# Patient Record
Sex: MALE
[De-identification: names, ages, dates, MRNs, and addresses within clinical notes are randomized per-mention and may not be internally consistent; named-entity substitution may affect disease eponyms.]

---

## 2022-09-01 ENCOUNTER — APPOINTMENT (OUTPATIENT)
Dept: ORTHOPEDIC SURGERY | Facility: CLINIC | Age: 22
End: 2022-09-01

## 2022-09-01 VITALS — WEIGHT: 165 LBS | HEIGHT: 73 IN | BODY MASS INDEX: 21.87 KG/M2

## 2022-09-01 DIAGNOSIS — Z78.9 OTHER SPECIFIED HEALTH STATUS: ICD-10-CM

## 2022-09-01 PROBLEM — Z00.00 ENCOUNTER FOR PREVENTIVE HEALTH EXAMINATION: Status: ACTIVE | Noted: 2022-09-01

## 2022-09-01 PROCEDURE — 99204 OFFICE O/P NEW MOD 45 MIN: CPT | Mod: 25

## 2022-09-01 PROCEDURE — L1812: CPT

## 2022-09-03 NOTE — DATA REVIEWED
[MRI] : MRI [Right] : of the right [Knee] : knee [Report was reviewed and noted in the chart] : The report was reviewed and noted in the chart [I independently reviewed and interpreted images and report] : I independently reviewed and interpreted images and report [I reviewed the films/CD and additionally noted] : I reviewed the films/CD and additionally noted [FreeTextEntry1] : MPFL tear, bone contusion lateral femoral condyle and medial patella, ACL and MCL sprains

## 2022-09-03 NOTE — HISTORY OF PRESENT ILLNESS
[de-identified] : The patient is a 22 year old R hand dominant male who presents today complaining of R knee pain.  \par Date of Injury/Onset: 08/26/2022\par Pain:    At Rest: 3/10 \par With Activity:  5/10 \par Mechanism of injury: Acute onset of pain and instability at the end of a follow through of a baseball swing.  \par This is not a Work Related Injury being treated under Worker's Compensation.\par This is not an athletic injury occurring associated with an interscholastic or organized sports team.\par Quality of symptoms: Soreness, swelling, pressure\par Improves with: N/A\par Worse with: Walking up/down stairs \par Prior treatment: Sleeve, ice, elevation\par Prior Imaging: MRI ([facility])\par Out of work/sport: _, since _\par School/Sport/Position/Occupation: Student, [employer]\par Additional Information: Needed crutches for first 48 hours but has not needed them since. \par

## 2022-09-03 NOTE — IMAGING
[de-identified] : The patient is a well appearing 22 year old M of their stated age.\par Patient ambulates with a normal gait.\par Negative straight leg raise bilateral\par \par Effected Knee: RIGHT                        	\par ROM:  0-145 degrees\par Lachman: Negative\par Pivot Shift: Negative\par Anterior Drawer: Negative\par Posterior Drawer / Sag:Negative\par Varus Stress 0 degrees: Stable\par Varus Stress 30 degrees: Stable\par Valgus Stress 0 degrees: Stable\par Valgus Stress 30 degrees: Stable\par Medial Nadya: Negative\par Lateral Nadya: Negative\par Patella Glide: 2 to 3+\par Patella Apprehension: Negative\par Patella Grind: Negative\par \par Palpation:\par Medial Joint Line: Nontender\par Lateral Joint Line: Nontender\par Medial Collateral Ligament: Nontender\par Lateral Collateral Ligament/PLC: Nontender\par Medial Femoral Condyle: Nontender\par Medial Retinaculum: Nontender\par Medial Patellofemoral Ligament: TENDER\par Distal Femur: Nontender\par Proximal Tibia: Nontender\par Tibial Tubercle: Nontender\par Distal Pole Patella: Nontender\par Quadriceps Tendon: Nontender &  Intact\par Patella Tendon: Nontender &  Intact\par Medial Distal Hamstring/PES: Nontender\par Lateral Distal Hamstring: Nontender & Stable\par Iliotibial Band: Nontender\par Medial Patellofemoral Ligament: Nontender\par Adductor: Nontender\par Proximal GSC-Plantaris: Nontender\par Calf: Supple & Nontender\par \par Inspection:\par Deformity: No\par Erythema: No\par Ecchymosis: No\par Abrasions: No\par Effusion: No\par Prepatella Bursitis: No\par Neurologic Exam:\par Sensation L4-S1: Grossly Intact\par \par Motor Exam:\par Quadriceps: 5 out of 5\par Hamstrings: 5 out of 5\par EHL: 5 out of 5\par FHL: 5 out of 5\par TA: 5 out of 5\par GS: 5 out of 5\par Circulatory/Pulses:\par Dorsalis Pedis: 2+\par Posterior Tibialis: 2+\par Additional Pertinent Findings: None\par Contralateral Knee:                           	\par ROM: 0-145 degrees\par Other Pertinent Findings: None\par \par Assessment: The patient is a 22 year old M with right knee pain and radiographic and physical exam findings consistent with MPFL tear and LFC bone contusion\par  \par The patient’s condition is acute\par Documents/Results Reviewed Today: MRI right knee\par Tests/Studies Independently Interpreted Today: MRI right knee reveals MPFL tear, bone contusion lateral femoral condyle and medial patella, ACL and MCL sprains\par Pertinent findings include: tender MPFL, 2 to 3+ patellar glide\par Confounding medical conditions/concerns: none\par \par Plan: Provided with PT script. Given info on repare knee sleeve. Will be placed in TruPull Lite brace. \par Tests Ordered: none\par Prescription Medications Ordered: none\par Braces/DME Ordered: TruPull Lite\par Activity/Work/Sports Status: Student, [employer]\par Additional Instructions: none\par Follow-Up: 6wks\par \par \par The patient's current medication management of their orthopedic diagnosis was reviewed today:\par (1) We discussed a comprehensive treatment plan that included possible pharmaceutical management involving the use of prescription strength medications including but not limited to options such as oral Naprosyn 500mg BID, once daily Meloxicam 15 mg, or 500-650 mg Tylenol versus over the counter oral medications and topical prescription NSAID Pennsaid vs over the counter Voltaren gel.\par (2) There is a moderate risk of morbidity with further treatment, especially from use of prescription strength medications and possible side effects of these medications which include upset stomach with oral medications, skin reactions to topical medications and cardiac/renal issues with long term use.\par (3) I recommended that the patient follow-up with their medical physician to discuss any significant specific potential issues with long term medication use such as interactions with current medications or with exacerbation of underlying medical comorbidities.\par (4) The benefits and risks associated with use of injectable, oral or topical, prescription and over the counter anti-inflammatory medications were discussed with the patient. The patient voiced understanding of the risks including but not limited to bleeding, stroke, kidney dysfunction, heart disease, and were referred to the black box warning label for further information.\par \par \par I, Neftali Lezama, attest that this documentation has been prepared under the direction and in the presence of Provider Dr. Renee\par \par \par The documentation recorded by the scribe accurately reflects the service I personally performed and the decisions made by me.\par \par

## 2022-10-10 ENCOUNTER — APPOINTMENT (OUTPATIENT)
Dept: ORTHOPEDIC SURGERY | Facility: CLINIC | Age: 22
End: 2022-10-10

## 2022-10-10 VITALS — WEIGHT: 165 LBS | HEIGHT: 73 IN | BODY MASS INDEX: 21.87 KG/M2

## 2022-10-10 DIAGNOSIS — M25.361 OTHER INSTABILITY, RIGHT KNEE: ICD-10-CM

## 2022-10-10 DIAGNOSIS — S80.01XA CONTUSION OF RIGHT KNEE, INITIAL ENCOUNTER: ICD-10-CM

## 2022-10-10 PROCEDURE — 99213 OFFICE O/P EST LOW 20 MIN: CPT

## 2022-10-11 NOTE — IMAGING
[de-identified] : The patient is a well appearing 22 year old M of their stated age.\par Patient ambulates with a normal gait.\par Negative straight leg raise bilateral\par \par Effected Knee: RIGHT                        	\par ROM:  0-150 degrees\par Lachman: Negative\par Pivot Shift: Negative\par Anterior Drawer: Negative\par Posterior Drawer / Sag:Negative\par Varus Stress 0 degrees: Stable\par Varus Stress 30 degrees: Stable\par Valgus Stress 0 degrees: Stable\par Valgus Stress 30 degrees: Stable\par Medial Nadya: Negative\par Lateral Nadya: Negative\par Patella Glide: 2 to 3+\par Patella Apprehension: Negative\par Patella Grind: Negative\par \par Palpation:\par Medial Joint Line: Nontender\par Lateral Joint Line: Nontender\par Medial Collateral Ligament: Nontender\par Lateral Collateral Ligament/PLC: Nontender\par Medial Femoral Condyle: Nontender\par Medial Retinaculum: Nontender\par Distal Femur: Nontender\par Proximal Tibia: Nontender\par Tibial Tubercle: Nontender\par Distal Pole Patella: Nontender\par Quadriceps Tendon: Nontender &  Intact\par Patella Tendon: Nontender &  Intact\par Medial Distal Hamstring/PES: Nontender\par Lateral Distal Hamstring: Nontender & Stable\par Iliotibial Band: Nontender\par Medial Patellofemoral Ligament: TENDER & UNSTABLE \par Adductor: Nontender\par Proximal GSC-Plantaris: Nontender\par Calf: Supple & Nontender\par \par Inspection:\par Deformity: No\par Erythema: No\par Ecchymosis: No\par Abrasions: No\par Effusion: No\par Prepatella Bursitis: No\par Neurologic Exam:\par Sensation L4-S1: Grossly Intact\par \par Motor Exam:\par Quadriceps: 4- out of 5\par Hamstrings: 5 out of 5\par EHL: 5 out of 5\par FHL: 5 out of 5\par TA: 5 out of 5\par GS: 5 out of 5\par Circulatory/Pulses:\par Dorsalis Pedis: 2+\par Posterior Tibialis: 2+\par Additional Pertinent Findings: None\par Contralateral Knee:                           	\par ROM: 0-145 degrees\par Other Pertinent Findings: None\par \par Assessment: The patient is a 22 year old M with right knee pain and radiographic and physical exam findings consistent with MPFL tear and LFC bone contusion\par  \par The patient’s condition is acute\par Documents/Results Reviewed Today:None \par Tests/Studies Independently Interpreted Today: None \par Pertinent findings include: tender and unstable MPFL, 2 to 3+ patellar glide, 0-150, 4- quad \par Confounding medical conditions/concerns: none\par \par Plan: Patient will continue physical therapy, HEP, and stretching. Continue use of TruPull lite brace and Reparel knee sleeve. Modify activity as discussed. \par Tests Ordered: none\par Prescription Medications Ordered: none\par Braces/DME Ordered: None \par Activity/Work/Sports Status: Student, [employer]\par Additional Instructions: none\par Follow-Up: 6wks\par \par \par The patient's current medication management of their orthopedic diagnosis was reviewed today:\par (1) We discussed a comprehensive treatment plan that included possible pharmaceutical management involving the use of prescription strength medications including but not limited to options such as oral Naprosyn 500mg BID, once daily Meloxicam 15 mg, or 500-650 mg Tylenol versus over the counter oral medications and topical prescription NSAID Pennsaid vs over the counter Voltaren gel.\par (2) There is a moderate risk of morbidity with further treatment, especially from use of prescription strength medications and possible side effects of these medications which include upset stomach with oral medications, skin reactions to topical medications and cardiac/renal issues with long term use.\par (3) I recommended that the patient follow-up with their medical physician to discuss any significant specific potential issues with long term medication use such as interactions with current medications or with exacerbation of underlying medical comorbidities.\par (4) The benefits and risks associated with use of injectable, oral or topical, prescription and over the counter anti-inflammatory medications were discussed with the patient. The patient voiced understanding of the risks including but not limited to bleeding, stroke, kidney dysfunction, heart disease, and were referred to the black box warning label for further information.\par \par \par I, Jyotsna Ly, attest that this documentation has been prepared under the direction and in the presence of Provider Dr. Harrison M. Paci.\par \par \par The documentation recorded by the scribe accurately reflects the service I personally performed and the decisions made by me.\par

## 2022-10-11 NOTE — HISTORY OF PRESENT ILLNESS
[de-identified] : The patient is a 22 year old right hand dominant male who presents today complaining of R knee pain.  \par Date of Injury/Onset: 08/26/2022\par Pain: At Rest: 0-1/10 \par With Activity:  3-4/10 \par Mechanism of injury: Acute onset of pain and instability at the end of a follow through of a baseball swing.  \par This is not a Work Related Injury being treated under Worker's Compensation.\par This is not an athletic injury occurring associated with an interscholastic or organized sports team.\par Quality of symptoms: Soreness, swelling, pressure\par Improves with: Has been wearing reparel sleeve and using Fredo-Pull Lite Brace\par Worse with: Walking up/down stairs \par Treatment/Imaging/Studies Since Last Visit: None\par 	Reports Available For Review Today: None\par Out of work/sport: Yes, since [9/2022]\par School/Sport/Position/Occupation: Student, [employer]\par Additional Information: Patient has been attending physical therapy

## 2022-11-21 ENCOUNTER — APPOINTMENT (OUTPATIENT)
Dept: ORTHOPEDIC SURGERY | Facility: CLINIC | Age: 22
End: 2022-11-21